# Patient Record
Sex: MALE | Race: WHITE | NOT HISPANIC OR LATINO | Employment: UNEMPLOYED | URBAN - METROPOLITAN AREA
[De-identification: names, ages, dates, MRNs, and addresses within clinical notes are randomized per-mention and may not be internally consistent; named-entity substitution may affect disease eponyms.]

---

## 2022-03-08 ENCOUNTER — APPOINTMENT (EMERGENCY)
Dept: RADIOLOGY | Facility: HOSPITAL | Age: 47
End: 2022-03-08
Payer: COMMERCIAL

## 2022-03-08 ENCOUNTER — HOSPITAL ENCOUNTER (EMERGENCY)
Facility: HOSPITAL | Age: 47
Discharge: HOME | End: 2022-03-08
Attending: EMERGENCY MEDICINE
Payer: COMMERCIAL

## 2022-03-08 VITALS
RESPIRATION RATE: 18 BRPM | BODY MASS INDEX: 34.65 KG/M2 | TEMPERATURE: 98.2 F | SYSTOLIC BLOOD PRESSURE: 143 MMHG | HEIGHT: 74 IN | WEIGHT: 270 LBS | OXYGEN SATURATION: 98 % | HEART RATE: 83 BPM | DIASTOLIC BLOOD PRESSURE: 85 MMHG

## 2022-03-08 DIAGNOSIS — S63.501A SPRAIN OF RIGHT WRIST, INITIAL ENCOUNTER: Primary | ICD-10-CM

## 2022-03-08 PROCEDURE — 73130 X-RAY EXAM OF HAND: CPT

## 2022-03-08 PROCEDURE — 99284 EMERGENCY DEPT VISIT MOD MDM: CPT | Performed by: PHYSICIAN ASSISTANT

## 2022-03-08 PROCEDURE — 73110 X-RAY EXAM OF WRIST: CPT

## 2022-03-08 PROCEDURE — 99283 EMERGENCY DEPT VISIT LOW MDM: CPT

## 2022-03-08 NOTE — DISCHARGE INSTRUCTIONS
Take Tylenol 650mg and ibuprofen 600mg every 6 hours as needed for pain  Apply ice and rest  Wear brace for comfort      Please follow-up with orthopedics if your symptoms persist

## 2022-03-08 NOTE — ED PROVIDER NOTES
History  Chief Complaint   Patient presents with    Hand Pain     Pt reports falling on his right side and is c/o right hand pain  Pt denies head strike      46yo left hand dominant male presenting for evaluation of right hand and wrist pain x 2 hours  Patient was walking and tripped and fell on his right hip and hand  No head strike or LOC  Patient has been ambulating since the fall but reports right hand and wrist discomfort  No paresthesias  He is otherwise asymptomatic  History provided by:  Patient   used: No    Hand Injury  Location:  Hand and wrist  Wrist location:  R wrist  Injury: yes    Time since incident:  2 hours  Mechanism of injury: fall    Fall:     Fall occurred:  Walking    Height of fall:  Ground level    Point of impact:  Hands  Pain details:     Quality:  Aching    Radiates to:  Does not radiate    Severity:  Mild    Onset quality:  Gradual    Timing:  Constant    Progression:  Unchanged  Handedness:  Left-handed  Dislocation: no    Relieved by:  Nothing  Worsened by: Movement  Associated symptoms: no back pain, no decreased range of motion, no fatigue, no fever, no muscle weakness, no neck pain, no numbness, no stiffness, no swelling and no tingling        None       History reviewed  No pertinent past medical history  History reviewed  No pertinent surgical history  History reviewed  No pertinent family history  I have reviewed and agree with the history as documented  E-Cigarette/Vaping     E-Cigarette/Vaping Substances     Social History     Tobacco Use    Smoking status: Never Smoker    Smokeless tobacco: Never Used   Substance Use Topics    Alcohol use: Not Currently    Drug use: Not Currently       Review of Systems   Constitutional: Negative for chills, fatigue and fever  Eyes: Negative for discharge and redness  Musculoskeletal: Positive for arthralgias  Negative for back pain, neck pain and stiffness     Skin: Negative for color change and wound    Neurological: Negative for weakness and numbness  Psychiatric/Behavioral: Negative for confusion  The patient is not nervous/anxious  All other systems reviewed and are negative  Physical Exam  Physical Exam  Vitals and nursing note reviewed  Constitutional:       General: He is not in acute distress  Appearance: Normal appearance  He is not toxic-appearing  HENT:      Head: Normocephalic and atraumatic  Right Ear: External ear normal       Left Ear: External ear normal    Eyes:      General: No scleral icterus  Right eye: No discharge  Left eye: No discharge  Conjunctiva/sclera: Conjunctivae normal    Cardiovascular:      Rate and Rhythm: Normal rate  Pulmonary:      Effort: Pulmonary effort is normal  No respiratory distress  Breath sounds: No stridor  Musculoskeletal:         General: No deformity  Normal range of motion  Right wrist: No swelling, deformity or lacerations  Normal range of motion  Right hand: No swelling or deformity  Normal range of motion  Cervical back: Normal range of motion  Comments: Right hand: No deformity or soft tissue swelling seen  He has mild tenderness at the 4th and 5th metacarpals  ROM of wrist intact  ROM of DIP, PIP, and MCP joints normal  2+ radial pulse  Sensation normal     Skin:     General: Skin is warm and dry  Neurological:      General: No focal deficit present  Mental Status: He is alert  Mental status is at baseline     Psychiatric:         Mood and Affect: Mood normal          Behavior: Behavior normal          Vital Signs  ED Triage Vitals [03/08/22 1456]   Temperature Pulse Respirations Blood Pressure SpO2   98 2 °F (36 8 °C) 83 18 143/85 98 %      Temp Source Heart Rate Source Patient Position - Orthostatic VS BP Location FiO2 (%)   Oral Monitor Sitting Left arm --      Pain Score       --           Vitals:    03/08/22 1456   BP: 143/85   Pulse: 83   Patient Position - Orthostatic VS: Sitting         Visual Acuity      ED Medications  Medications - No data to display    Diagnostic Studies  Results Reviewed     None                 XR hand 3+ views RIGHT   ED Interpretation by Nati Gregg PA-C (03/08 1521)   No acute fracture      Final Result by Ruben Anderson MD (03/08 1608)      No acute osseous abnormality  Workstation performed: [id number]         XR wrist 3+ vw right   ED Interpretation by Nati Gregg PA-C (03/08 1521)   No acute fracture      Final Result by Ruben Anderson MD (03/08 1609)      No acute osseous abnormality  Workstation performed: [id number]                    Procedures  Procedures         ED Course                   MDM  Number of Diagnoses or Management Options  Sprain of right wrist, initial encounter: new and requires workup  Diagnosis management comments: 46yo male presenting for right wrist/hand pain after a mechanical fall 2 hours ago  He has no deformity or swelling on exam  Tenderness is very mild  ROM normal  RUE is neurovascularly intact  X-rays of right hand and wrist obtained  No fractures seen on imaging  He was diagnosed with a sprain  Advised RICE, Tylenol, and ibuprofen  He was provided with a prefabricated wrist brace for comfort  Advised f/u with ortho if symptoms persist  He was discharged in stable condition          Amount and/or Complexity of Data Reviewed  Tests in the radiology section of CPT®: ordered and reviewed  Independent visualization of images, tracings, or specimens: yes    Risk of Complications, Morbidity, and/or Mortality  Presenting problems: low  Diagnostic procedures: low  Management options: low    Patient Progress  Patient progress: stable      Disposition  Final diagnoses:   Sprain of right wrist, initial encounter     Time reflects when diagnosis was documented in both MDM as applicable and the Disposition within this note     Time User Action Codes Description Comment    3/8/2022  3:27 PM [facility] [[id number]] Sprain of right wrist, initial encounter       ED Disposition     ED Disposition Condition Date/Time Comment    Discharge Stable Tue Mar 8, 2022  3:27 PM Flaquita Daugherty discharge to home/self care  Follow-up Information     Follow up With Specialties Details Why Contact Info Additional [address] [facility]  As needed [address] [place] [address] [phone]  600 [facility], [address], [place], [place], [address] Emergency Department Emergency Medicine  If symptoms worsen 34 [facility] Emergency Department, [address] [place], [place], [facility] [place] [facility] [place]          There are no discharge medications for this patient  No discharge procedures on file      PDMP Review     None          ED Provider  Electronically Signed by           Roldan Dowell PA-C  03/08/22 0265